# Patient Record
(demographics unavailable — no encounter records)

---

## 2017-07-17 NOTE — HP
CC:  Dr. Falcon at [facility]

 

PREOPERATIVE HISTORY AND PHYSICAL:

 

DATE OF ADMISSION:  This patient is scheduled for same day surgery admission by 
Dr. Falcon on Tuesday, 07/25/17.

 

DATE OF PREOPERATIVE HISTORY AND PHYSICAL EXAM:  Monday, 07/17/17.

 

ATTENDING SURGEON:  Dr. Dov Falcon* (dictated by Everardo Quiros NP).

 

CHIEF COMPLAINT:  Left inguinal hernia.

 

HISTORY OF PRESENT ILLNESS:  The patient is a 38-year-old male recently 
evaluated by Dr. Falcon for a left inguinal hernia as well as an umbilical 
hernia.  The patient reports that he has symptoms from Giardia 2 years ago, 
which led to colonoscopy and a diagnosis of diverticulosis as well.  Recently, 
he had symptoms of pain in the left lower quadrant and a CAT scan of the 
abdomen was performed and he was diagnosed with 2 hernias; one in the left 
inguinal region and one in the umbilicus.  He notes that he is generally very 
physically active, but he is avoiding exercise due to the pain in the left 
groin that is exacerbated by strenuous activity and even by walking upstairs.  
He denies any dysuria; he does have a history of irritable bowel syndrome and 
stools can be irregular, alternating between formed and loose and he is 
currently on a medication called Viberzi, which is an antispasmodic.  He denies 
any blood or mucous in the stools.  He denies any fevers or chills.  He has a 
good appetite and has not noticed any weight loss.  Dr. Falcon examined the 
patient and reviewed the findings with him and has recommended laparoscopic 
left inguinal hernia repair with mesh and a simultaneous repair of the 
umbilical hernia.  Dr. Falcon described the nature of the surgical procedure 
the relevant risks, and benefits and the rationale for the procedure and today 
I reviewed the expected postoperative care and recovery.  The patient has had a 
chance to ask questions and stated that he understands the information and is 
satisfied with the answers given to his questions.  He will sign surgical 
consent on the day of surgery.

 

PAST MEDICAL HISTORY:  Significant for irritable bowel syndrome and 
diverticulosis; exercise-induced asthma, and multiple environmental allergies.

 

PAST SURGICAL HISTORY:  Repair of left Achilles tendon; right shoulder surgery 
for torn labrum.

 

MEDICATIONS:

1.  Viberzi 100 mg 1 tablet daily with food.

2.  Escitalopram 20 mg 1 tablet daily at bedtime.

3.  Nasonex 50 mcg per actuation 2 sprays in each nostril p.r.n.

4.  ProAir inhaler 2 puffs every 4 hours p.r.n.

5.  QVAR 80 mcg 2 puffs daily p.r.n.

 

ALLERGIES:  AMOXICILLIN, ERYTHROMYCIN, CECLOR, PEDIAZOLE and PENICILLIN, all 
cause hives and throat swelling.  PREDNISONE cause hypertension.

 

FAMILY HISTORY:  Father alive at age 76 with pacemaker.  Mother alive and well.
  No known family history of anesthesia complications, bleeding tendencies or 
clotting disorders.

 

SOCIAL HISTORY:  He is single and he is employed at [employer] as the 
 Adult [employer]; he is currently on medical leave; he has 
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 
as in recovery.  He is active with exercising.

 

REVIEW OF SYSTEMS:  Constitutional:  He denies any changes in his weight or 
denies any fever or chills.  Cardiovascular:  Denies chest pain, pressure, or 
palpitations.  Respiratory:  Denies shortness of breath or chronic cough.  GI:  
He has a history of irritable bowel syndrome and diverticulosis and Giardia. 
Genitourinary:  Denies any dysuria or history of kidney stones.  He denies any 
previous anesthesia complications.  He denies any bleeding tendencies and has 
never received a blood transfusion.  He denies any history of deep vein 
thrombosis or pulmonary embolism.

 

                               PHYSICAL EXAMINATION

 

GENERAL SURVEY:  The patient is a 38-year-old male, well developed, well 
nourished, in no acute distress.

 

VITAL SIGNS:  Height 70 inches, weight 205 pounds, body mass index 29.4.  Blood 
pressure 114/78, pulse 78 and regular, respiratory rate 16, temperature 97.4 
tympanic.

 

HEENT:  Benign.

 

NECK:  Supple.  No cervical lymphadenopathy.  No thyromegaly.

 

BACK:  No CVA tenderness.

 

LUNGS:  Breath sounds bilaterally clear and equal.

 

HEART:  Regular rate and rhythm.  No murmurs or rubs appreciated.

 

ABDOMEN:  Active bowel sounds.  No surgical scars.  Soft and nondistended.  
Mildly tender in the left lower quadrant.  No guarding or rebound tenderness.  
No obvious masses or organomegaly.  1 cm umbilical hernia without any 
tenderness.  Inguinal exam as done by Dr. Falcon revealed a reducible left 
inguinal hernia.  No right inguinal hernia.

 

GENITALIA:  Exam is deferred.

 

RECTAL:  Exam is deferred.

 

EXTREMITIES:  Warm without edema or skin ulceration.

 

NEUROLOGIC:  Alert and oriented x3, steady gait.

 

SKIN:  Warm, dry, intact.

 

 IMPRESSION:

1.  Left inguinal hernia.

2.  Umbilical hernia.

 

PLAN:  Same day surgery admission to Dr. Falcon' service on Tuesday, 07/25/17, 
for laparoscopic repair of left inguinal hernia and simultaneous repair of 
umbilical hernia.

 

 ____________________________________ 

EVERARDO QUIROS, NP

 

[id number]/CPS #: [id number]

[id number]

## 2017-07-26 NOTE — OP
CC:  Jensen Juarez MD *

 

DATE OF OPERATION:  07/25/17 - [facility]

 

DATE OF BIRTH:  08/29/78

 

SURGEON:  Dov Falcon MD

 

ASSISTANT:  AMBAR Kelly

 

ANESTHESIOLOGIST:  Dr. Wayne.

 

ANESTHESIA:  General endotracheal.

 

PRE-OP DIAGNOSES:  Left inguinal hernia and umbilical hernia.

 

POST-OP DIAGNOSES:  Left inguinal hernia and umbilical hernia.

 

OPERATIVE PROCEDURE:  Laparoscopic repair of left inguinal hernia with mesh and 
open repair of umbilical hernia.

 

ESTIMATED BLOOD LOSS:  Minimal.

 

IV FLUIDS:  Crystalloid.

 

SPECIMEN:  None.

 

DRAINS:  None.

 

COMPLICATIONS:  None.

 

COUNTS:  The instrument, needle, and sponge counts were correct.

 

DESCRIPTION OF PROCEDURE:  The patient was brought to the operating room and 
placed on the table supine.  Sequential compression devices were placed on both 
lower extremities.  General anesthesia was administered.  A House catheter was 
placed. He was positioned and padded appropriately and received appropriate 
intravenous antibiotics.  Time-out was performed.

 

Local anesthetic was infiltrated into the skin and subcutaneous tissues prior 
to making its incision.  A curvilinear infraumbilical incision was created and 
subcutaneous tissues were divided and the anterior rectus abdominis fascia was 
identified to the left of midline and incised transversely.  The underlying 
muscles retracted laterally and the preperitoneal balloon dissector was 
positioned down to the level of the pubic symphysis.  It was insufflated under 
direct visualization and then removed and replaced with a 12 mm blunt port.  
Insufflation of the preperitoneal space was achieved to a pressure of 15 mmHg.  
Under direct visualization, two 5 mm trocars were placed in the lower midline.

 

Dissection proceeded from the midline laterally identifying the pubis symphysis
, Stanislaw's ligament, and inferior epigastric vessels which were preserved 
anteriorly. There was no direct inguinal hernia identified.  There was a 
indirect inguinal hernia sac which was identified and this was elevated.  Cord 
structures were freed from it.  The sac was allowed to retract.  Dissection 
proceeded laterally to the anterior superior iliac spine.  After completing the 
dissection, the repair was performed with the Bard 3D Max large size mesh for 
left-sided repair.  The mesh was placed into the preperitoneal space and 
positioned to cover the direct, indirect, and femoral spaces.  It was secured 
with the CapSure tacker at the Stanislaw's ligament at the pubic tubercle and the 
mesh was held out laterally as the preperitoneal space was allowed to 
desufflate to assure that the mesh was properly positioned.  The ports were 
then removed.  The infraumbilical site was closed with 0 Polysorb in 
interrupted fashion.

 

Next, the umbilical hernia was addressed.  The umbilical stalk was dissected 
free from the anterior abdominal wall. 

A 1 cm hernia was identified and the fascial edges were cleaned.  The closure 
was performed with a 0 Ti-Cron suture 

in interrupted fashion.  Subsequently, a 3-0 Polysorb was used to reapproximate 
the umbilical stalk to the anterior abdominal wall and the skin incisions were 
closed with 4-0 Monocryl.  The patient tolerated the procedure well, was 
extubated and transferred to Recovery in stable condition.

 

 [id number]/CPS #: [id number]

LARISA